# Patient Record
Sex: MALE | Race: WHITE
[De-identification: names, ages, dates, MRNs, and addresses within clinical notes are randomized per-mention and may not be internally consistent; named-entity substitution may affect disease eponyms.]

---

## 2017-02-06 ENCOUNTER — RECORD ABSTRACTING (OUTPATIENT)
Age: 82
End: 2017-02-06

## 2017-02-06 DIAGNOSIS — I51.9 HEART DISEASE, UNSPECIFIED: ICD-10-CM

## 2017-02-06 DIAGNOSIS — I72.9 ANEURYSM OF UNSPECIFIED SITE: ICD-10-CM

## 2017-02-06 DIAGNOSIS — Z48.02 ENCOUNTER FOR REMOVAL OF SUTURES: ICD-10-CM

## 2017-02-06 DIAGNOSIS — Z98.890 OTHER SPECIFIED POSTPROCEDURAL STATES: ICD-10-CM

## 2017-02-06 DIAGNOSIS — L57.0 ACTINIC KERATOSIS: ICD-10-CM

## 2017-02-06 DIAGNOSIS — D23.39 OTHER BENIGN NEOPLASM OF SKIN OF OTHER PARTS OF FACE: ICD-10-CM

## 2017-02-06 DIAGNOSIS — Z78.9 OTHER SPECIFIED HEALTH STATUS: ICD-10-CM

## 2017-03-15 ENCOUNTER — APPOINTMENT (OUTPATIENT)
Dept: DERMATOLOGY | Facility: CLINIC | Age: 82
End: 2017-03-15

## 2017-04-10 ENCOUNTER — APPOINTMENT (OUTPATIENT)
Dept: DERMATOLOGY | Facility: CLINIC | Age: 82
End: 2017-04-10

## 2017-04-10 VITALS — HEIGHT: 63 IN | WEIGHT: 133 LBS | BODY MASS INDEX: 23.57 KG/M2

## 2017-04-10 DIAGNOSIS — Z85.828 PERSONAL HISTORY OF OTHER MALIGNANT NEOPLASM OF SKIN: ICD-10-CM

## 2017-04-10 RX ORDER — ASPIRIN AND DIPYRIDAMOLE 25; 200 MG/1; MG/1
25-200 CAPSULE, EXTENDED RELEASE ORAL
Refills: 0 | Status: ACTIVE | COMMUNITY

## 2017-04-10 RX ORDER — CYCLOBENZAPRINE HCL 5 MG
TABLET ORAL
Refills: 0 | Status: ACTIVE | COMMUNITY

## 2017-04-10 RX ORDER — RANITIDINE HYDROCHLORIDE 150 MG/1
150 TABLET, FILM COATED ORAL
Refills: 0 | Status: ACTIVE | COMMUNITY

## 2017-04-10 RX ORDER — FINASTERIDE 5 MG/1
5 TABLET, FILM COATED ORAL
Refills: 0 | Status: ACTIVE | COMMUNITY

## 2017-08-18 ENCOUNTER — OUTPATIENT (OUTPATIENT)
Dept: OUTPATIENT SERVICES | Facility: HOSPITAL | Age: 82
LOS: 1 days | Discharge: HOME | End: 2017-08-18

## 2017-08-18 DIAGNOSIS — E78.5 HYPERLIPIDEMIA, UNSPECIFIED: ICD-10-CM

## 2017-09-12 ENCOUNTER — APPOINTMENT (OUTPATIENT)
Dept: DERMATOLOGY | Facility: CLINIC | Age: 82
End: 2017-09-12
Payer: MEDICARE

## 2017-09-12 DIAGNOSIS — L82.1 OTHER SEBORRHEIC KERATOSIS: ICD-10-CM

## 2017-09-12 DIAGNOSIS — I78.1 NEVUS, NON-NEOPLASTIC: ICD-10-CM

## 2017-09-12 DIAGNOSIS — L81.4 OTHER MELANIN HYPERPIGMENTATION: ICD-10-CM

## 2017-09-12 DIAGNOSIS — D22.9 MELANOCYTIC NEVI, UNSPECIFIED: ICD-10-CM

## 2017-09-12 DIAGNOSIS — D48.5 NEOPLASM OF UNCERTAIN BEHAVIOR OF SKIN: ICD-10-CM

## 2017-09-12 DIAGNOSIS — Z00.00 ENCOUNTER FOR GENERAL ADULT MEDICAL EXAMINATION W/OUT ABNORMAL FINDINGS: ICD-10-CM

## 2017-09-12 PROCEDURE — 99213 OFFICE O/P EST LOW 20 MIN: CPT | Mod: 25

## 2017-09-12 PROCEDURE — 11100 BX SKIN SUBCUTANEOUS&/MUCOUS MEMBRANE 1 LESION: CPT

## 2017-09-12 RX ORDER — CHOLECALCIFEROL (VITAMIN D3) 25 MCG
TABLET ORAL
Refills: 0 | Status: DISCONTINUED | COMMUNITY
End: 2017-09-12

## 2017-09-19 LAB — CORE LAB BIOPSY: NORMAL

## 2018-03-14 ENCOUNTER — APPOINTMENT (OUTPATIENT)
Dept: DERMATOLOGY | Facility: CLINIC | Age: 83
End: 2018-03-14

## 2018-03-21 ENCOUNTER — OUTPATIENT (OUTPATIENT)
Dept: OUTPATIENT SERVICES | Facility: HOSPITAL | Age: 83
LOS: 1 days | Discharge: HOME | End: 2018-03-21

## 2018-03-21 DIAGNOSIS — D64.9 ANEMIA, UNSPECIFIED: ICD-10-CM

## 2018-03-21 LAB
ACANTHOCYTES BLD QL SMEAR: SLIGHT — SIGNIFICANT CHANGE UP
ANISOCYTOSIS BLD QL: SLIGHT — SIGNIFICANT CHANGE UP
BASOPHILS NFR BLD AUTO: 1 % — SIGNIFICANT CHANGE UP (ref 0–2)
ELLIPTOCYTES BLD QL SMEAR: SLIGHT — SIGNIFICANT CHANGE UP
EOSINOPHIL NFR BLD AUTO: 0 % — SIGNIFICANT CHANGE UP (ref 0–6)
HCT VFR BLD CALC: 30.4 % — LOW (ref 39–50)
HGB BLD-MCNC: 10.4 G/DL — LOW (ref 13–17)
IRON SATN MFR SERPL: 69 UG/DL — SIGNIFICANT CHANGE UP (ref 45–165)
LYMPHOCYTES # BLD AUTO: 0.76 K/UL — LOW (ref 1–3.3)
LYMPHOCYTES # BLD AUTO: 24 % — SIGNIFICANT CHANGE UP (ref 13–44)
MANUAL SMEAR VERIFICATION: SIGNIFICANT CHANGE UP
MCHC RBC-ENTMCNC: 32.5 PG — SIGNIFICANT CHANGE UP (ref 27–34)
MCHC RBC-ENTMCNC: 34.2 GM/DL — SIGNIFICANT CHANGE UP (ref 32–36)
MCV RBC AUTO: 95 FL — SIGNIFICANT CHANGE UP (ref 80–100)
MONOCYTES NFR BLD AUTO: 15 % — HIGH (ref 2–14)
NEUTROPHILS # BLD AUTO: 1.89 K/UL — SIGNIFICANT CHANGE UP (ref 1.8–7.4)
NEUTROPHILS NFR BLD AUTO: 60 % — SIGNIFICANT CHANGE UP (ref 43–77)
NRBC # BLD: 0 /100 — SIGNIFICANT CHANGE UP (ref 0–0)
NRBC # BLD: SIGNIFICANT CHANGE UP /100 WBCS (ref 0–0)
OVALOCYTES BLD QL SMEAR: SLIGHT — SIGNIFICANT CHANGE UP
PLAT MORPH BLD: NORMAL — SIGNIFICANT CHANGE UP
PLATELET # BLD AUTO: 74 K/UL — LOW (ref 150–400)
PLATELET COUNT - ESTIMATE: (no result)
POIKILOCYTOSIS BLD QL AUTO: SIGNIFICANT CHANGE UP
RBC # BLD: 3.2 M/UL — LOW (ref 4.2–5.8)
RBC # FLD: 13.3 % — SIGNIFICANT CHANGE UP (ref 10.3–14.5)
RBC BLD AUTO: (no result)
WBC # BLD: 3.15 K/UL — LOW (ref 3.8–10.5)
WBC # FLD AUTO: 3.15 K/UL — LOW (ref 3.8–10.5)

## 2018-06-12 ENCOUNTER — OUTPATIENT (OUTPATIENT)
Dept: OUTPATIENT SERVICES | Facility: HOSPITAL | Age: 83
LOS: 1 days | Discharge: HOME | End: 2018-06-12

## 2018-06-12 DIAGNOSIS — R97.20 ELEVATED PROSTATE SPECIFIC ANTIGEN [PSA]: ICD-10-CM

## 2018-06-12 DIAGNOSIS — R53.83 OTHER FATIGUE: ICD-10-CM

## 2018-06-12 LAB
ALBUMIN SERPL ELPH-MCNC: 3.9 G/DL — SIGNIFICANT CHANGE UP (ref 3.3–5)
ALP SERPL-CCNC: 67 U/L — SIGNIFICANT CHANGE UP (ref 40–120)
ALT FLD-CCNC: 27 U/L — SIGNIFICANT CHANGE UP (ref 12–78)
ANION GAP SERPL CALC-SCNC: 5 MMOL/L — SIGNIFICANT CHANGE UP (ref 5–17)
AST SERPL-CCNC: 36 U/L — SIGNIFICANT CHANGE UP (ref 15–37)
BASOPHILS # BLD AUTO: 0.02 K/UL — SIGNIFICANT CHANGE UP (ref 0–0.2)
BASOPHILS NFR BLD AUTO: 0.6 % — SIGNIFICANT CHANGE UP (ref 0–2)
BILIRUB SERPL-MCNC: 1.5 MG/DL — HIGH (ref 0.2–1.2)
BUN SERPL-MCNC: 22 MG/DL — SIGNIFICANT CHANGE UP (ref 7–23)
CALCIUM SERPL-MCNC: 8.8 MG/DL — SIGNIFICANT CHANGE UP (ref 8.5–10.1)
CHLORIDE SERPL-SCNC: 107 MMOL/L — SIGNIFICANT CHANGE UP (ref 96–108)
CHOLEST SERPL-MCNC: 137 MG/DL — SIGNIFICANT CHANGE UP (ref 10–199)
CO2 SERPL-SCNC: 28 MMOL/L — SIGNIFICANT CHANGE UP (ref 22–31)
CREAT SERPL-MCNC: 1.02 MG/DL — SIGNIFICANT CHANGE UP (ref 0.5–1.3)
EOSINOPHIL # BLD AUTO: 0 K/UL — SIGNIFICANT CHANGE UP (ref 0–0.5)
EOSINOPHIL NFR BLD AUTO: 0 % — SIGNIFICANT CHANGE UP (ref 0–6)
GLUCOSE SERPL-MCNC: 86 MG/DL — SIGNIFICANT CHANGE UP (ref 70–99)
HCT VFR BLD CALC: 33.7 % — LOW (ref 39–50)
HDLC SERPL-MCNC: 75 MG/DL — SIGNIFICANT CHANGE UP (ref 40–125)
HGB BLD-MCNC: 11.3 G/DL — LOW (ref 13–17)
IMM GRANULOCYTES NFR BLD AUTO: 0.6 % — SIGNIFICANT CHANGE UP (ref 0–1.5)
LIPID PNL WITH DIRECT LDL SERPL: 50 MG/DL — SIGNIFICANT CHANGE UP
LYMPHOCYTES # BLD AUTO: 0.84 K/UL — LOW (ref 1–3.3)
LYMPHOCYTES # BLD AUTO: 23.3 % — SIGNIFICANT CHANGE UP (ref 13–44)
MCHC RBC-ENTMCNC: 32.6 PG — SIGNIFICANT CHANGE UP (ref 27–34)
MCHC RBC-ENTMCNC: 33.5 GM/DL — SIGNIFICANT CHANGE UP (ref 32–36)
MCV RBC AUTO: 97.1 FL — SIGNIFICANT CHANGE UP (ref 80–100)
MONOCYTES # BLD AUTO: 0.35 K/UL — SIGNIFICANT CHANGE UP (ref 0–0.9)
MONOCYTES NFR BLD AUTO: 9.7 % — SIGNIFICANT CHANGE UP (ref 2–14)
NEUTROPHILS # BLD AUTO: 2.37 K/UL — SIGNIFICANT CHANGE UP (ref 1.8–7.4)
NEUTROPHILS NFR BLD AUTO: 65.8 % — SIGNIFICANT CHANGE UP (ref 43–77)
NRBC # BLD: 0 /100 WBCS — SIGNIFICANT CHANGE UP (ref 0–0)
PLATELET # BLD AUTO: 92 K/UL — LOW (ref 150–400)
POTASSIUM SERPL-MCNC: 4.9 MMOL/L — SIGNIFICANT CHANGE UP (ref 3.5–5.3)
POTASSIUM SERPL-SCNC: 4.9 MMOL/L — SIGNIFICANT CHANGE UP (ref 3.5–5.3)
PROT SERPL-MCNC: 7.1 GM/DL — SIGNIFICANT CHANGE UP (ref 6–8.3)
PSA FREE FLD-MCNC: 0.22 NG/ML — SIGNIFICANT CHANGE UP
PSA FREE FLD-MCNC: 42.3 — SIGNIFICANT CHANGE UP
PSA FREE MFR FLD: 0.52 NG/ML — SIGNIFICANT CHANGE UP (ref 0–4)
RBC # BLD: 3.47 M/UL — LOW (ref 4.2–5.8)
RBC # FLD: 13.3 % — SIGNIFICANT CHANGE UP (ref 10.3–14.5)
SODIUM SERPL-SCNC: 140 MMOL/L — SIGNIFICANT CHANGE UP (ref 135–145)
TOTAL CHOLESTEROL/HDL RATIO MEASUREMENT: 1.8 RATIO — LOW (ref 3.4–9.6)
TRIGL SERPL-MCNC: 58 MG/DL — SIGNIFICANT CHANGE UP (ref 10–149)
WBC # BLD: 3.6 K/UL — LOW (ref 3.8–10.5)
WBC # FLD AUTO: 3.6 K/UL — LOW (ref 3.8–10.5)

## 2019-04-10 ENCOUNTER — OUTPATIENT (OUTPATIENT)
Dept: OUTPATIENT SERVICES | Facility: HOSPITAL | Age: 84
LOS: 1 days | Discharge: HOME | End: 2019-04-10

## 2019-04-15 DIAGNOSIS — I25.10 ATHEROSCLEROTIC HEART DISEASE OF NATIVE CORONARY ARTERY WITHOUT ANGINA PECTORIS: ICD-10-CM

## 2019-06-09 PROBLEM — I51.9 HEART DISEASE: Status: ACTIVE | Noted: 2017-02-06

## 2019-12-19 ENCOUNTER — EMERGENCY (EMERGENCY)
Facility: HOSPITAL | Age: 84
LOS: 0 days | Discharge: HOME | End: 2019-12-19
Attending: EMERGENCY MEDICINE
Payer: MEDICARE

## 2019-12-19 VITALS
DIASTOLIC BLOOD PRESSURE: 74 MMHG | SYSTOLIC BLOOD PRESSURE: 146 MMHG | TEMPERATURE: 98 F | HEART RATE: 60 BPM | OXYGEN SATURATION: 100 % | RESPIRATION RATE: 16 BRPM

## 2019-12-19 VITALS
HEART RATE: 71 BPM | SYSTOLIC BLOOD PRESSURE: 193 MMHG | DIASTOLIC BLOOD PRESSURE: 77 MMHG | TEMPERATURE: 98 F | RESPIRATION RATE: 16 BRPM | OXYGEN SATURATION: 100 %

## 2019-12-19 DIAGNOSIS — N40.0 BENIGN PROSTATIC HYPERPLASIA WITHOUT LOWER URINARY TRACT SYMPTOMS: ICD-10-CM

## 2019-12-19 DIAGNOSIS — S00.33XA CONTUSION OF NOSE, INITIAL ENCOUNTER: ICD-10-CM
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
## 2019-12-19 DIAGNOSIS — K21.9 GASTRO-ESOPHAGEAL REFLUX DISEASE WITHOUT ESOPHAGITIS: ICD-10-CM

## 2019-12-19 DIAGNOSIS — I71.2 THORACIC AORTIC ANEURYSM, WITHOUT RUPTURE: ICD-10-CM

## 2019-12-19 DIAGNOSIS — I10 ESSENTIAL (PRIMARY) HYPERTENSION: ICD-10-CM
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
## 2019-12-19 DIAGNOSIS — I25.10 ATHEROSCLEROTIC HEART DISEASE OF NATIVE CORONARY ARTERY WITHOUT ANGINA PECTORIS: ICD-10-CM

## 2019-12-19 DIAGNOSIS — S00.31XA ABRASION OF NOSE, INITIAL ENCOUNTER: ICD-10-CM

## 2019-12-19 DIAGNOSIS — Z87.01 PERSONAL HISTORY OF PNEUMONIA (RECURRENT): ICD-10-CM

## 2019-12-19 DIAGNOSIS — E78.00 PURE HYPERCHOLESTEROLEMIA, UNSPECIFIED: ICD-10-CM

## 2019-12-19 PROCEDURE — 72125 CT NECK SPINE W/O DYE: CPT

## 2019-12-19 PROCEDURE — 70450 CT HEAD/BRAIN W/O DYE: CPT

## 2019-12-19 PROCEDURE — 99283 EMERGENCY DEPT VISIT LOW MDM: CPT

## 2019-12-19 PROCEDURE — 99284 EMERGENCY DEPT VISIT MOD MDM: CPT | Mod: 25

## 2019-12-19 PROCEDURE — 76376 3D RENDER W/INTRP POSTPROCES: CPT

## 2019-12-19 PROCEDURE — 70450 CT HEAD/BRAIN W/O DYE: CPT | Mod: 26

## 2019-12-19 PROCEDURE — 72125 CT NECK SPINE W/O DYE: CPT | Mod: 26

## 2019-12-19 PROCEDURE — 70486 CT MAXILLOFACIAL W/O DYE: CPT

## 2019-12-19 PROCEDURE — 70486 CT MAXILLOFACIAL W/O DYE: CPT | Mod: 26

## 2019-12-19 PROCEDURE — 76376 3D RENDER W/INTRP POSTPROCES: CPT | Mod: 26
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
## 2019-12-19 NOTE — ED STATDOCS - NSFOLLOWUPINSTRUCTIONS_ED_ALL_ED_FT
Abrasion    WHAT YOU NEED TO KNOW:    An abrasion is a scrape on your skin. It happens when your skin rubs against a rough surface. Some examples of an abrasion include rug burn, a skinned elbow, or road rash. Abrasions can be many shapes and sizes. The wound may hurt, bleed, bruise, or swell.     DISCHARGE INSTRUCTIONS:    Return to the emergency department if:     The bleeding does not stop after 10 minutes of firm pressure.      You cannot rinse one or more foreign objects out of your wound.      You have red streaks on your skin coming from your wound.    Contact your healthcare provider if:     You have a fever or chills.       Your abrasion is red, warm, swollen, or draining pus.      You have questions or concerns about your condition or care.    Care for your abrasion:     Wash your hands and dry them with a clean towel.      Press a clean cloth against your wound to stop any bleeding.      Rinse your wound with a lot of clean water. Do not use harsh soap, alcohol, or iodine solutions.      Use a clean, wet cloth to remove any objects, such as small pieces of rocks or dirt.      Rub antibiotic ointment on your wound. This may help prevent infection and help your wound heal.      Cover the wound with a non-stick bandage. Change the bandage daily, and if gets wet or dirty.     Follow up with your healthcare provider as directed: Write down your questions so you remember to ask them during your visits.    Contusion in Adults    WHAT YOU NEED TO KNOW:    A contusion is a bruise that appears on your skin after an injury. A bruise happens when small blood vessels tear but skin does not. When blood vessels tear, blood leaks into nearby tissue, such as soft tissue or muscle.    DISCHARGE INSTRUCTIONS:    Return to the emergency department if:     You have new trouble moving the injured area.      You have tingling or numbness in or near the injured area.      Your hand or foot below the bruise gets cold or turns pale.     Contact your healthcare provider if:     You find a new lump in the injured area.      Your symptoms do not improve with treatment after 4 to 5 days.      You have questions or concerns about your condition or care.    Medicines: You may need any of the following:     NSAIDs help decrease swelling and pain or fever. This medicine is available with or without a doctor's order. NSAIDs can cause stomach bleeding or kidney problems in certain people. If you take blood thinner medicine, always ask your healthcare provider if NSAIDs are safe for you. Always read the medicine label and follow directions.      Prescription pain medicine may be given. Do not wait until the pain is severe before you take your medicine.      Take your medicine as directed. Contact your healthcare provider if you think your medicine is not helping or if you have side effects. Tell him of her if you are allergic to any medicine. Keep a list of the medicines, vitamins, and herbs you take. Include the amounts, and when and why you take them. Bring the list or the pill bottles to follow-up visits. Carry your medicine list with you in case of an emergency.    Follow up with your healthcare provider as directed: You may need to return within a week to check your injury again. Write down your questions so you remember to ask them during your visits.    Help a contusion heal:     Rest the injured area or use it less than usual. If you bruised your leg or foot, you may need crutches or a cane to help you walk. This will help you keep weight off your injured body part.       Apply ice to decrease swelling and pain. Ice may also help prevent tissue damage. Use an ice pack, or put crushed ice in a plastic bag. Cover it with a towel and place it on your bruise for 15 to 20 minutes every hour or as directed.      Use compression to support the area and decrease swelling. Wrap an elastic bandage around the area over the bruised muscle. Make sure the bandage is not too tight. You should be able to fit 1 finger between the bandage and your skin.      Elevate (raise) your injured body part above the level of your heart to help decrease pain and swelling. Use pillows, blankets, or rolled towels to elevate the area as often as you can.      Do not drink alcohol as directed. Alcohol may slow healing.      Do not stretch injured muscles right after your injury. Ask your healthcare provider when and how you may safely stretch after your injury. Gentle stretches can help increase your flexibility.      Do not massage the area or put heating pads on the bruise right after your injury. Heat and massage may slow healing. Your healthcare provider may tell you to apply heat after several days. At that time, heat will start to help the injury heal.    Prevent another contusion:     Stretch and warm up before you play sports or exercise.      Wear protective gear when you play sports. Examples are shin guards and padding.       If you begin a new physical activity, start slowly to give your body a chance to adjust.    Head Injury    WHAT YOU NEED TO KNOW:    A head injury is most often caused by a blow to the head. This may occur from a fall, bicycle injury, sports injury, being struck in the head, or a motor vehicle accident.     DISCHARGE INSTRUCTIONS:    Call 911 or have someone else call for any of the following:     You cannot be woken.      You have a seizure.      You stop responding to others or you faint.      You have blurry or double vision.      Your speech becomes slurred or confused.      You have arm or leg weakness, loss of feeling, or new problems with coordination.      Your pupils are larger than usual or one pupil is a different size than the other.       You have blood or clear fluid coming out of your ears or nose.    Return to the emergency department if:     You have repeated or forceful vomiting.      You feel confused.      Your headache gets worse or becomes severe.      You or someone caring for you notices that you are harder to wake than usual.    Contact your healthcare provider if:     Your symptoms last longer than 6 weeks after the injury.      You have questions or concerns about your condition or care.    Medicines:     Acetaminophen decreases pain. Acetaminophen is available without a doctor's order. Ask how much to take and how often to take it. Follow directions. Acetaminophen can cause liver damage if not taken correctly.      Take your medicine as directed. Contact your healthcare provider if you think your medicine is not helping or if you have side effects. Tell him or her if you are allergic to any medicine. Keep a list of the medicines, vitamins, and herbs you take. Include the amounts, and when and why you take them. Bring the list or the pill bottles to follow-up visits. Carry your medicine list with you in case of an emergency.    Self-care:     Rest or do quiet activities for 24 to 48 hours. Limit your time watching TV, using the computer, or doing tasks that require a lot of thinking. Slowly return to your normal activities as directed. Do not play sports or do activities that may cause you to get hit in the head. Ask your healthcare provider when you can return to sports.       Apply ice on your head for 15 to 20 minutes every hour or as directed. Use an ice pack, or put crushed ice in a plastic bag. Cover it with a towel before you apply it to your skin. Ice helps prevent tissue damage and decreases swelling and pain.       Have someone stay with you for 24 hours or as directed. This person can monitor you for complications and call 911. When you are awake the person should ask you a few questions to see if you are thinking clearly. An example would be to ask your name or your address.     Prevent another head injury:     Wear a helmet that fits properly. Do this when you play sports, or ride a bike, scooter, or skateboard. Helmets help decrease your risk of a serious head injury. Talk to your healthcare provider about other ways you can protect yourself if you play sports.      Wear your seat belt every time you are in a car. This helps to decrease your risk for a head injury if you are in a car accident.     Follow up with your healthcare provider as directed: Write down your questions so you remember to ask them during your visits.

## 2019-12-19 NOTE — ED ADULT NURSE NOTE - CHPI ED NUR SYMPTOMS NEG
no numbness/no deformity/no fever/no bleeding/no confusion/no tingling/no loss of consciousness/no vomiting/no weakness

## 2019-12-19 NOTE — ED STATDOCS - ATTENDING CONTRIBUTION TO CARE
I, Dyllan Villegas MD,  performed the initial face to face bedside interview with this patient regarding history of present illness, review of symptoms and relevant past medical, social and family history.  I completed an independent physical examination.  I was the initial provider who evaluated this patient. I have signed out the follow up of any pending tests (i.e. labs, radiological studies) to the ACP.  I have communicated the patient’s plan of care and disposition with the ACP.  The history, relevant review of systems, past medical and surgical history, medical decision making, and physical examination was documented by the scribe in my presence and I attest to the accuracy of the documentation.
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
## 2019-12-19 NOTE — ED STATDOCS - PROGRESS NOTE DETAILS
Patient seen and evaluated with ED attending at intake intially.  Well appearing, normal mentation, SOTO.  Abrasion to nasal bridge with skin avulsion, no suturable laceration.  CT with no acute traumatic injury.  Reviewed symptom management at home, return precautions, head injury precautions and PMD f/u -Kevin Milton PA-C

## 2019-12-19 NOTE — ED STATDOCS - ENMT, MLM
Mouth with normal mucosa  Throat has no vesicles, no oropharyngeal exudates and uvula is midline. +avulsion bridge of nose, dried blood left nares, no active bleeding, swelling to nose, teeth intact

## 2019-12-19 NOTE — ED STATDOCS - PATIENT PORTAL LINK FT
You can access the FollowMyHealth Patient Portal offered by [facility] by registering at the following website: [email or web address]. By joining [employer]’s FollowMyHealth portal, you will also be able to view your health information using other applications (apps) compatible with our system.

## 2019-12-19 NOTE — ED STATDOCS - CARE PLAN
Principal Discharge DX:	Contusion of nose, initial encounter  Secondary Diagnosis:	Abrasion of nose  Secondary Diagnosis:	Fall
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
## 2019-12-19 NOTE — ED ADULT NURSE NOTE - BREATHING, MLM
Would like to see urology  Has been on 2 meds without success  Getting up several times at night Spontaneous, unlabored and symmetrical

## 2019-12-20 ENCOUNTER — TRANSCRIPTION ENCOUNTER (OUTPATIENT)
Age: 84
End: 2019-12-20

## 2020-01-30 ENCOUNTER — OUTPATIENT (OUTPATIENT)
Dept: OUTPATIENT SERVICES | Facility: HOSPITAL | Age: 85
LOS: 1 days | End: 2020-01-30
Payer: MEDICARE

## 2020-01-30 DIAGNOSIS — E78.5 HYPERLIPIDEMIA, UNSPECIFIED: ICD-10-CM

## 2020-01-30 DIAGNOSIS — E11.9 TYPE 2 DIABETES MELLITUS WITHOUT COMPLICATIONS: ICD-10-CM

## 2020-01-30 PROCEDURE — 80061 LIPID PANEL: CPT

## 2020-01-30 PROCEDURE — 36415 COLL VENOUS BLD VENIPUNCTURE: CPT

## 2020-01-30 PROCEDURE — 82550 ASSAY OF CK (CPK): CPT

## 2020-01-30 PROCEDURE — 83036 HEMOGLOBIN GLYCOSYLATED A1C: CPT

## 2020-01-30 PROCEDURE — 82248 BILIRUBIN DIRECT: CPT

## 2020-01-30 PROCEDURE — 86140 C-REACTIVE PROTEIN: CPT

## 2020-01-30 PROCEDURE — 80053 COMPREHEN METABOLIC PANEL: CPT

## 2020-01-30 PROCEDURE — 82306 VITAMIN D 25 HYDROXY: CPT

## 2020-01-31 DIAGNOSIS — E78.5 HYPERLIPIDEMIA, UNSPECIFIED: ICD-10-CM

## 2020-01-31 DIAGNOSIS — E11.9 TYPE 2 DIABETES MELLITUS WITHOUT COMPLICATIONS: ICD-10-CM

## 2020-03-01 ENCOUNTER — EMERGENCY (EMERGENCY)
Facility: HOSPITAL | Age: 85
LOS: 0 days | Discharge: HOME | End: 2020-03-01
Attending: EMERGENCY MEDICINE
Payer: MEDICARE

## 2020-03-01 VITALS
HEART RATE: 78 BPM | RESPIRATION RATE: 19 BRPM | TEMPERATURE: 99 F | OXYGEN SATURATION: 97 % | DIASTOLIC BLOOD PRESSURE: 79 MMHG | SYSTOLIC BLOOD PRESSURE: 153 MMHG

## 2020-03-01 VITALS — WEIGHT: 132.06 LBS | HEIGHT: 63 IN

## 2020-03-01 DIAGNOSIS — R01.1 CARDIAC MURMUR, UNSPECIFIED: ICD-10-CM

## 2020-03-01 DIAGNOSIS — I10 ESSENTIAL (PRIMARY) HYPERTENSION: ICD-10-CM

## 2020-03-01 DIAGNOSIS — I25.10 ATHEROSCLEROTIC HEART DISEASE OF NATIVE CORONARY ARTERY WITHOUT ANGINA PECTORIS: ICD-10-CM

## 2020-03-01 DIAGNOSIS — E78.00 PURE HYPERCHOLESTEROLEMIA, UNSPECIFIED: ICD-10-CM

## 2020-03-01 DIAGNOSIS — Z90.49 ACQUIRED ABSENCE OF OTHER SPECIFIED PARTS OF DIGESTIVE TRACT: ICD-10-CM

## 2020-03-01 DIAGNOSIS — Z88.8 ALLERGY STATUS TO OTHER DRUGS, MEDICAMENTS AND BIOLOGICAL SUBSTANCES STATUS: ICD-10-CM

## 2020-03-01 DIAGNOSIS — M50.90 CERVICAL DISC DISORDER, UNSPECIFIED, UNSPECIFIED CERVICAL REGION: ICD-10-CM

## 2020-03-01 DIAGNOSIS — N40.0 BENIGN PROSTATIC HYPERPLASIA WITHOUT LOWER URINARY TRACT SYMPTOMS: ICD-10-CM

## 2020-03-01 DIAGNOSIS — Z95.5 PRESENCE OF CORONARY ANGIOPLASTY IMPLANT AND GRAFT: ICD-10-CM

## 2020-03-01 DIAGNOSIS — K64.9 UNSPECIFIED HEMORRHOIDS: ICD-10-CM

## 2020-03-01 DIAGNOSIS — K62.5 HEMORRHAGE OF ANUS AND RECTUM: ICD-10-CM

## 2020-03-01 DIAGNOSIS — R41.3 OTHER AMNESIA: ICD-10-CM

## 2020-03-01 DIAGNOSIS — I71.2 THORACIC AORTIC ANEURYSM, WITHOUT RUPTURE: ICD-10-CM

## 2020-03-01 DIAGNOSIS — K21.9 GASTRO-ESOPHAGEAL REFLUX DISEASE WITHOUT ESOPHAGITIS: ICD-10-CM

## 2020-03-01 PROCEDURE — 99282 EMERGENCY DEPT VISIT SF MDM: CPT

## 2020-03-01 NOTE — ED ADULT TRIAGE NOTE - CHIEF COMPLAINT QUOTE
Pt complains of rectal bleeding beginning approx 1 hour ago. Pt states that he has had constipation, took a suppository and then was able to have a bowel movement. Pt states that there was bright red blood when he wiped.

## 2020-03-01 NOTE — ED ADULT NURSE NOTE - OBJECTIVE STATEMENT
pt is a 89 y/o male w/ pmhx of HTN and hemmorhoids presenting to ED for eval of one large episode of blood in stool after straining to have BM today. pt states he has been constipated over the past several days, self administered a suppository, which was productive. pt states episode of bright red blood was self limiting.

## 2020-03-01 NOTE — ED ADULT NURSE NOTE - NSIMPLEMENTINTERV_GEN_ALL_ED
Implemented All Fall with Harm Risk Interventions:  [place] to call system. Call bell, personal items and telephone within reach. Instruct patient to call for assistance. Room bathroom lighting operational. Non-slip footwear when patient is off stretcher. Physically safe environment: no spills, clutter or unnecessary equipment. Stretcher in lowest position, wheels locked, appropriate side rails in place. Provide visual cue, wrist band, yellow gown, etc. Monitor gait and stability. Monitor for mental status changes and reorient to person, place, and time. Review medications for side effects contributing to fall risk. Reinforce activity limits and safety measures with patient and family. Provide visual clues: red socks.

## 2020-03-01 NOTE — ED PROVIDER NOTE - NS_ ATTENDINGSCRIBEDETAILS _ED_A_ED_FT
I, Chet Vera MD,  performed the initial face to face bedside interview with this patient regarding history of present illness, review of symptoms and relevant past medical, social and family history.  I completed an independent physical examination.    The history, relevant review of systems, past medical and surgical history, medical decision making, and physical examination was documented by the scribe in my presence and I attest to the accuracy of the documentation.
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
## 2020-03-01 NOTE — ED STATDOCS - PROGRESS NOTE DETAILS
Saurav DE LEON for ED Attending Dr. Ramon: 88 y/o male with PMHx of AAA, BPH, HTN, HLD, GERD, CAD presents to the ED c/o bright red blood in stool. +Rectal pain. Pt notes that episode occurred after pt attempted to use suppository tor resolve constipation. On 81mg ASA. No other acute complaints in ED at this time. Will send pt to the Main ED for further evaluation.

## 2020-03-01 NOTE — ED PROVIDER NOTE - OBJECTIVE STATEMENT
89 y/o with a PMHx of AA, cervical disc disease, memory loss, Hemorrhoids, heart murmur, BPH, GERD, trauma, CAD, hypercholesterolemia, hypertension, pneumonia, pleurisy, presents to the ED c/o rectal bleeding and pain. Pt was constipated for past few days. Put a suppository in today with no relief. Tried to go to pass stool, wiped (+pain), and noticed blood. Came to [facility] for further evaluation. About 20 minutes prior to evaluation, was able to pass stool. Denied seeing blood in the stool at that time. Has a hx of Hemorrhoids. Takes baby ASA. PMD: Dr. Mensah. No other complaints at this time.

## 2020-03-01 NOTE — ED PROVIDER NOTE - PATIENT PORTAL LINK FT
You can access the FollowMyHealth Patient Portal offered by [facility] by registering at the following website: [email or web address]. By joining [employer]’s FollowMyHealth portal, you will also be able to view your health information using other applications (apps) compatible with our system.

## 2020-03-03 NOTE — ED ADULT TRIAGE NOTE - NS ED TRIAGE AVPU SCALE
The stitches will dissolve on its own. Return to the ER with any concerning or worsening symptoms.  
Alert-The patient is alert, awake and responds to voice. The patient is oriented to time, place, and person. The triage nurse is able to obtain subjective information.

## 2020-05-02 ENCOUNTER — TRANSCRIPTION ENCOUNTER (OUTPATIENT)
Age: 85
End: 2020-05-02

## 2020-05-29 ENCOUNTER — TRANSCRIPTION ENCOUNTER (OUTPATIENT)
Age: 85
End: 2020-05-29

## 2020-07-08 NOTE — ED STATDOCS - DR. NAME
Subjective:     Abbey Lewis is a 5 y o  male who is brought in for this well child visit  History provided by: patient and Navin Ramirez will be going into the 4th grade, at [employer]  He requires help in math and in reading  Caserashid is able to swim and ride a bicycle  He participates in karate 3 times per week  Medications:  Multiple vitamins with fluoride, and Zyrtec  Allergies:  Clindamycin  Dentist:  Dr Pretty Malik    Current Issues:  Current concerns: none  Well Child Assessment:  History was provided by the grandmother (Abbey)  Abbey lives with his mother (Maternal grandparents live next door)  Interval problems include caregiver stress  (COVID-19 stresses, including mother being laid off for 3 weeks)     Nutrition  Types of intake include cow's milk, meats, eggs, fish, vegetables, fruits and cereals  Junk food includes candy, chips and desserts  Dental  The patient has a dental home (Dr Pretty Malik)  The patient brushes teeth regularly  The patient does not floss regularly  Last dental exam was less than 6 months ago  Elimination  Elimination problems include constipation  Elimination problems do not include urinary symptoms  (Constipation has improved) There is no bed wetting  Behavioral  Behavioral issues do not include misbehaving with peers or performing poorly at school  Disciplinary methods include praising good behavior  Sleep  Average sleep duration is 10 hours  The patient does not snore  There are no sleep problems  Safety  There is smoking in the home  Home has working smoke alarms? yes  Home has working carbon monoxide alarms? yes  There is a gun in home (Guns stored in locked cabinet)  School  Current grade level is 4th  Current school district is [address] [place] [address]; [place]  There are signs of learning disabilities (Receives help in math and reading)  Child is performing acceptably in school     Screening  Immunizations are up-to-date  There are no risk factors for hearing loss  There are risk factors for anemia  There are risk factors for dyslipidemia  There are no risk factors for tuberculosis  Social  The caregiver enjoys the child  After school, the child is at home with a parent  The child spends 3 hours in front of a screen (tv or computer) per day       Past Medical History:   Diagnosis Date    Allergic rhinitis     Constipation     Otitis media     Speech delay     Strep throat      Past Surgical History:   Procedure Laterality Date    CIRCUMCISION  03/2011     Family History   Problem Relation Age of Onset    No Known Problems Mother     No Known Problems Father     No Known Problems Maternal Grandmother     No Known Problems Maternal Grandfather     Glaucoma Paternal Grandmother     Hypertension Paternal Grandfather     Down syndrome Maternal Uncle     Hearing loss Maternal Uncle     Alcohol abuse Neg Hx     Substance Abuse Neg Hx     Mental illness Neg Hx      Social History     Socioeconomic History    Marital status: Single     Spouse name: Not on file    Number of children: Not on file    Years of education: Not on file    Highest education level: Not on file   Occupational History    Not on file   Social Needs    Financial resource strain: Not on file    Food insecurity:     Worry: Not on file     Inability: Not on file    Transportation needs:     Medical: Not on file     Non-medical: Not on file   Tobacco Use    Smoking status: Never Smoker    Smokeless tobacco: Never Used   Substance and Sexual Activity    Alcohol use: Not on file    Drug use: Not on file    Sexual activity: Not on file   Lifestyle    Physical activity:     Days per week: Not on file     Minutes per session: Not on file    Stress: Not on file   Relationships    Social connections:     Talks on phone: Not on file     Gets together: Not on file     Attends [employer]ism service: Not on file     Active member of club or organization: Not on file     Attends meetings of clubs or organizations: Not on file     Relationship status: Not on file    Intimate partner violence:     Fear of current or ex partner: Not on file     Emotionally abused: Not on file     Physically abused: Not on file     Forced sexual activity: Not on file   Other Topics Concern    Not on file   Social History Narrative    Lives with mom; visits dad or paternal grandma  Maternal grandparents and uncle live next door  Passive tobacco smoke exposure in dad's home    Pets - 1 cat at mom's , 1 dog at grandmother's next door    Has smoke and CO detectors at [employer] in homes locked in [facility] booster seat in car    In 4 th grade, [employer], Fall 2020      Patient Active Problem List   Diagnosis    Blepharitis of both eyes     The following portions of the patient's history were reviewed and updated as appropriate: allergies, current medications, past family history, past medical history, past social history, past surgical history and problem list           Objective:       Vitals:    07/08/20 0905   BP: 100/70   Pulse: 88   Resp: 18   Temp: 98 1 °F (36 7 °C)   Weight: 34 7 kg (76 lb 6 4 oz)   Height: 4' 4 25" (1 327 m)     Growth parameters are noted and are appropriate for age  Wt Readings from Last 1 Encounters:   07/08/20 34 7 kg (76 lb 6 4 oz) (80 %, Z= 0 84)*     * Growth percentiles are based on CDC (Boys, 2-20 Years) data  Ht Readings from Last 1 Encounters:   07/08/20 4' 4 25" (1 327 m) (35 %, Z= -0 40)*     * Growth percentiles are based on CDC (Boys, 2-20 Years) data  Body mass index is 19 68 kg/m²      Vitals:    07/08/20 0905   BP: 100/70   Pulse: 88   Resp: 18   Temp: 98 1 °F (36 7 °C)   Weight: 34 7 kg (76 lb 6 4 oz)   Height: 4' 4 25" (1 327 m)        Visual Acuity Screening    Right eye Left eye Both eyes   Without correction: 20/20 20/25 20/20   With correction:          Physical Exam   Constitutional: He appears well-developed and well-nourished  He is active  No distress  HENT:   Right Ear: Tympanic membrane normal    Left Ear: Tympanic membrane normal    Nose: Nose normal    Mouth/Throat: Mucous membranes are moist  Dentition is normal  Oropharynx is clear  Eyes: Pupils are equal, round, and reactive to light  Conjunctivae and EOM are normal  Right eye exhibits no discharge  Left eye exhibits no discharge  Neck: Neck supple  Cardiovascular: Normal rate, regular rhythm, S1 normal and S2 normal  Pulses are palpable  No murmur heard  Pulmonary/Chest: Effort normal and breath sounds normal    Abdominal: Soft  Bowel sounds are normal  He exhibits no mass  There is no hepatosplenomegaly  There is no tenderness  No hernia  Genitourinary:   Genitourinary Comments: Circumcised penis  Testes descended bilaterally  Ronald stage 1-2   Musculoskeletal:   Soreness of the left lower leg, without abrasion of the left lower leg, from a fall he had while playing  Able to hop up and down on both legs  Back:  No scoliosis   Lymphadenopathy:     He has no cervical adenopathy  Neurological: He is alert  He exhibits normal muscle tone  Skin: No rash noted  Vitals reviewed  Assessment:     Healthy 5 y o  male child  1  Encounter for well child check without abnormal findings     2  Nasal congestion  cetirizine (ZyrTEC) 10 mg tablet   3  Encounter for vision screening     4  Nutritional counseling     5  Exercise counseling     6  Screening for deficiency anemia  CBC and differential   7  Screening cholesterol level  Lipid panel    Comprehensive metabolic panel   8  BMI (body mass index), pediatric, 85% to less than 95% for age     5   Need for vaccination  HEPATITIS A VACCINE PEDIATRIC / ADOLESCENT 2 DOSE IM        Plan:        Patient Instructions     Safety counseling, including water safety, sun safety, pedestrian safety, vehicle safety, and tick safety  Cleared for all activities  Recommend decreasing some of the between meal snacking  Recommend drinking water rather than the sweet drink  Recommend increasing aerobic activity, especially swimming  Time can be taken from electronic activity  Screening laboratory studies as are generally recommended  Vaccine information Sheet provided and discussed for the hepatitis A vaccine  If any discomfort associated with the immunization, acetaminophen, 325 mg by mouth every 6 hours as needed  Follow-up:  By telephone at  for the laboratory results, recommend influenza immunization this autumn, at yearly physical examinations, and as otherwise needed  Well Child Visit at 5 to 8 Years   AMBULATORY CARE:   A well child visit  is when your child sees a healthcare provider to prevent health problems  Well child visits are used to track your child's growth and development  It is also a time for you to ask questions and to get information on how to keep your child safe  Write down your questions so you remember to ask them  Your child should have regular well child visits from birth to 16 years  Development milestones your child may reach by 9 to 10 years:  Each child develops at his or her own pace  Your child might have already reached the following milestones, or he or she may reach them later:  · Menstruation (monthly periods) in girls and testicle enlargement in boys    · Wanting to be more independent, and to be with friends more than with family    · Developing more friendships    · Able to handle more difficult homework    · Be given chores or other responsibilities to do at home  Keep your child safe in the car:   · Have your child ride in a booster seat,  and make sure everyone in your car wears a seatbelt  ¨ Children aged 5 to 8 years should ride in a booster car seat  Your child must stay in the booster car seat until he or she is between 6and 15years old and 4 foot 9 inches (57 inches) tall   This is when a regular seatbelt should fit your child properly without the booster seat  ¨ Booster seats come with and without a seat back  Your child will be secured in the booster seat with the regular seatbelt in your car  ¨ Your child should remain in a forward-facing car seat if you only have a lap belt seatbelt in your car  Some forward-facing car seats hold children who weigh more than 40 pounds  The harness on the forward-facing car seat will keep your child safer and more secure than a lap belt and booster seat  · Always put your child's car seat in the back seat  Never put your child's car seat in the front  This will help prevent him or her from being injured in an accident  Keep your child safe in the sun and near water:   · Teach your child how to swim  Even if your child knows how to swim, do not let him or her play around water alone  An adult needs to be present and watching at all times  Make sure your child wears a safety vest when he or she is on a boat  · Make sure your child puts sunscreen on before he or she goes outside to play or swim  Use sunscreen with a SPF 15 or higher  Use as directed  Apply sunscreen at least 15 minutes before your child goes outside  Reapply sunscreen every 2 hours  Other ways to keep your child safe:   · Encourage your child to use safety equipment  Encourage your child to wear a helmet when he or she rides a bicycle and protective gear when he or she plays sports  Protective gear includes a helmet, mouth guard, and pads that are appropriate for the sport  · Remind your child how to cross the street safely  Remind your child to stop at the curb, look left, then look right, and left again  Tell your child never to cross the street without an adult  Teach your child where the school bus will pick him or her up and drop him or her off  Always have adult supervision at your child's bus stop  · Store and lock all guns and weapons  Make sure all guns are unloaded before you store them  Make sure your child cannot reach or find where weapons or bullets are kept  Never  leave a loaded gun unattended  · Remind your child about emergency safety  Be sure your child knows what to do in case of a fire or other emergency  Teach your child how to call 911  · Talk to your child about personal safety without making him or her anxious  Teach him or her that no one has the right to touch his or her private parts  Also explain that others should not ask your child to touch their private parts  Let your child know that he or she should tell you even if he or she is told not to  Help your child get the right nutrition:   · Teach your child about a healthy meal plan by setting a good example  Buy healthy foods for your family  Eat healthy meals together as a family as often as possible  Talk with your child about why it is important to choose healthy foods  · Provide a variety of fruits and vegetables  Half of your child's plate should contain fruits and vegetables  He or she should eat about 5 servings of fruits and vegetables each day  Buy fresh, canned, or dried fruit instead of fruit juice as often as possible  Offer more dark green, red, and orange vegetables  Dark green vegetables include broccoli, spinach, guillermo lettuce, and ashish greens  Examples of orange and red vegetables are carrots, sweet potatoes, winter squash, and red peppers  · Make sure your child has a healthy breakfast every day  Breakfast can help your child learn and focus better in school  · Limit foods that contain sugar and are low in healthy nutrients  Limit candy, soda, fast food, and salty snacks  Do not give your child fruit drinks  Limit 100% juice to 4 to 6 ounces each day  · Teach your child how to make healthy food choices  A healthy lunch may include a sandwich with lean meat, cheese, or peanut butter  It could also include a fruit, vegetable, and milk   Pack healthy foods if your child takes his or her own lunch to school  Pack baby carrots or pretzels instead of potato chips in your child's lunch box  You can also add fruit or low-fat yogurt instead of cookies  Keep his or her lunch cold with an ice pack so that it does not spoil  · Make sure your child gets enough calcium  Calcium is needed to build strong bones and teeth  Children need about 2 to 3 servings of dairy each day to get enough calcium  Good sources of calcium are low-fat dairy foods (milk, cheese, and yogurt)  A serving of dairy is 8 ounces of milk or yogurt, or 1½ ounces of cheese  Other foods that contain calcium include tofu, kale, spinach, broccoli, almonds, and calcium-fortified orange juice  Ask your child's healthcare provider for more information about the serving sizes of these foods  · Provide whole-grain foods  Half of the grains your child eats each day should be whole grains  Whole grains include brown rice, whole-wheat pasta, and whole-grain cereals and breads  · Provide lean meats, poultry, fish, and other healthy protein foods  Other healthy protein foods include legumes (such as beans), soy foods (such as tofu), and peanut butter  Bake, broil, and grill meat instead of frying it to reduce the amount of fat  · Use healthy fats to prepare your child's food  A healthy fat is unsaturated fat  It is found in foods such as soybean, canola, olive, and sunflower oils  It is also found in soft tub margarine that is made with liquid vegetable oil  Limit unhealthy fats such as saturated fat, trans fat, and cholesterol  These are found in shortening, butter, stick margarine, and animal fat  Help your  for his or her teeth:   · Remind your child to brush his or her teeth 2 times each day  He or she also needs to floss 1 time each day  Mouth care prevents infection, plaque, bleeding gums, mouth sores, and cavities  · Take your child to the dentist at least 2 times each year    A dentist can check for problems with his or her teeth or gums, and provide treatments to protect his or her teeth  · Encourage your child to wear a mouth guard during sports  This will protect his or her teeth from injury  Make sure the mouth guard fits correctly  Ask your child's healthcare provider for more information on mouth guards  Support your child:   · Encourage your child to get 1 hour of physical activity each day  Examples of physical activity include sports, running, walking, swimming, and riding bikes  The hour of physical activity does not need to be done all at once  It can be done in shorter blocks of time  Your child may become involved in a sport or other activity, such as music lessons  It is important not to schedule too many activities in a week  Make sure your child has time for homework, rest, and play  · Limit screen time  Your child should spend no more than 2 hours watching TV, using the computer, or playing video games  Set up a security filter on your computer to limit what your child can access on the internet  · Help your child learn outside of the classroom  Take your child to places that will help him or her learn and discover  For example, a children'[employer] will allow him or her to touch and play with objects as he or she learns  Take your child to [employer] and let him or her pick out books  Make sure he or she returns the books  · Encourage your child to talk about school every day  Talk to your child about the good and bad things that happened during the school day  Encourage him or her to tell you or a teacher if someone is being mean to him or her  Talk to your child about bullying  Make sure he or she knows it is not acceptable for him or her to be bullied, or to bully another child  Talk to your child's teacher about help or tutoring if your child is not doing well in school  · Create a place for your child to do his or her homework    Your child should have a table or desk where he or she has everything he or she needs to do his or her homework  Do not let him or her watch TV or play computer games while he or she is doing his or her homework  Your child should only use a computer during homework time if he or she needs it for an assignment  Encourage your child to do his or her homework early instead of waiting until the last minute  Set rules for homework time, such as no TV or computer games until his or her homework is done  Praise your child for finishing homework  Let him or her know you are available if he or she needs help  · Help your child feel confident and secure  Give your child hugs and encouragement  Do activities together  Praise your child when he or she does tasks and activities well  Do not hit, shake, or spank your child  Set boundaries and make sure he or she knows what the punishment will be if rules are broken  Teach your child about acceptable behaviors  · Help your child learn responsibility  Give your child a chore to do regularly, such as taking out the trash  Expect your child to do the chore  You might want to offer an allowance or other reward for chores your child does regularly  Decide on a punishment for not doing the chore, such as no TV for a period of time  Be consistent with rewards and punishments  This will help your child learn that his or her actions will have good or bad results  What you need to know about your child's next well child visit:  Your child's healthcare provider will tell you when to bring him or her in again  The next well child visit is usually at 6 to 14 years  Contact your child's healthcare provider if you have questions or concerns about your child's health or care before the next visit  Your child may get the following vaccines at his or her next visit: Tdap, HPV, and meningococcal  He or she may need catch-up doses of the hepatitis B, hepatitis A, MMR, or chickenpox vaccine   Remember to take your child in for a yearly flu vaccine  © 2017 [employer] Information is for End User's use only and may not be sold, redistributed or otherwise used for commercial purposes  All illustrations and images included in CareNotes® are the copyrighted property of A D A M , Inc  or Richard Zamora  The above information is an  only  It is not intended as medical advice for individual conditions or treatments  Talk to your doctor, nurse or pharmacist before following any medical regimen to see if it is safe and effective for you  1  Anticipatory guidance discussed  Specific topics reviewed: bicycle helmets, discipline issues: limit-setting, positive reinforcement, fluoride supplementation if unfluoridated water supply, importance of regular dental care, importance of regular exercise, importance of varied diet, minimize junk food, seat belts; don't put in front seat and teaching pedestrian safety  Nutrition and Exercise Counseling: The patient's Body mass index is 19 68 kg/m²  This is 90 %ile (Z= 1 27) based on CDC (Boys, 2-20 Years) BMI-for-age based on BMI available as of 7/8/2020  Nutrition counseling provided:  Avoid juice/sugary drinks  Anticipatory guidance for nutrition given and counseled on healthy eating habits  Exercise counseling provided:  Anticipatory guidance and counseling on exercise and physical activity given  Reduce screen time to less than 2 hours per day  1 hour of aerobic exercise daily  2  Development: appropriate for age    1  Immunizations today: per orders  Vaccine Counseling: Discussed with: Ped parent/guardian: Maternal grandmother  The benefits, contraindication and side effects for the following vaccines were reviewed: Immunization component list: Hep A  Total number of components reveiwed:1    4  Follow-up visit in 1 year for next well child visit, or sooner as needed  [place]

## 2020-07-17 ENCOUNTER — OUTPATIENT (OUTPATIENT)
Dept: OUTPATIENT SERVICES | Facility: HOSPITAL | Age: 85
LOS: 1 days | End: 2020-07-17
Payer: MEDICARE

## 2020-07-17 DIAGNOSIS — E78.5 HYPERLIPIDEMIA, UNSPECIFIED: ICD-10-CM

## 2020-07-17 PROCEDURE — 36415 COLL VENOUS BLD VENIPUNCTURE: CPT

## 2020-07-17 PROCEDURE — 80053 COMPREHEN METABOLIC PANEL: CPT

## 2020-07-17 PROCEDURE — 82306 VITAMIN D 25 HYDROXY: CPT

## 2020-07-17 PROCEDURE — 80061 LIPID PANEL: CPT

## 2020-07-17 PROCEDURE — 83036 HEMOGLOBIN GLYCOSYLATED A1C: CPT

## 2020-07-17 PROCEDURE — 82248 BILIRUBIN DIRECT: CPT

## 2020-07-17 PROCEDURE — 82550 ASSAY OF CK (CPK): CPT

## 2020-07-17 PROCEDURE — 86140 C-REACTIVE PROTEIN: CPT

## 2020-07-18 DIAGNOSIS — E78.5 HYPERLIPIDEMIA, UNSPECIFIED: ICD-10-CM

## 2020-07-18 PROBLEM — K64.9 UNSPECIFIED HEMORRHOIDS: Chronic | Status: ACTIVE | Noted: 2020-03-01

## 2020-10-10 ENCOUNTER — TRANSCRIPTION ENCOUNTER (OUTPATIENT)
Age: 85
End: 2020-10-10

## 2021-12-14 ENCOUNTER — TRANSCRIPTION ENCOUNTER (OUTPATIENT)
Age: 86
End: 2021-12-14

## 2022-01-21 ENCOUNTER — TRANSCRIPTION ENCOUNTER (OUTPATIENT)
Age: 87
End: 2022-01-21

## 2022-02-21 ENCOUNTER — TRANSCRIPTION ENCOUNTER (OUTPATIENT)
Age: 87
End: 2022-02-21

## 2022-06-17 ENCOUNTER — NON-APPOINTMENT (OUTPATIENT)
Age: 87
End: 2022-06-17

## 2022-06-23 ENCOUNTER — OFFICE (OUTPATIENT)
Dept: URBAN - METROPOLITAN AREA CLINIC 12 | Facility: CLINIC | Age: 87
Setting detail: OPHTHALMOLOGY
End: 2022-06-23
Payer: MEDICARE

## 2022-06-23 DIAGNOSIS — H25.13: ICD-10-CM

## 2022-06-23 DIAGNOSIS — H35.3131: ICD-10-CM

## 2022-06-23 PROCEDURE — 99204 OFFICE O/P NEW MOD 45 MIN: CPT | Performed by: OPHTHALMOLOGY

## 2022-06-23 PROCEDURE — 92134 CPTRZ OPH DX IMG PST SGM RTA: CPT | Performed by: OPHTHALMOLOGY

## 2022-06-23 ASSESSMENT — TONOMETRY
OD_IOP_MMHG: 11
OS_IOP_MMHG: 15

## 2022-06-23 ASSESSMENT — REFRACTION_CURRENTRX
OS_VPRISM_DIRECTION: TRF
OD_CYLINDER: -2.75
OD_AXIS: 088
OD_ADD: +2.50
OS_VPRISM_DIRECTION: TRF
OD_VPRISM_DIRECTION: TRF
OS_SPHERE: +3.50
OD_ADD: +2.75
OS_CYLINDER: -2.25
OS_CYLINDER: -2.25
OD_OVR_VA: 20/
OS_AXIS: 093
OS_AXIS: 093
OS_SPHERE: +3.25
OS_CYLINDER: -2.75
OS_SPHERE: +3.25
OD_SPHERE: +3.75
OS_OVR_VA: 20/
OD_VPRISM_DIRECTION: TRF
OS_ADD: +2.75
OD_CYLINDER: -2.75
OS_AXIS: 092
OS_OVR_VA: 20/
OS_OVR_VA: 20/
OD_VPRISM_DIRECTION: TRF
OS_VPRISM_DIRECTION: TRF
OD_AXIS: 088
OD_OVR_VA: 20/
OD_CYLINDER: -2.50
OD_AXIS: 086
OD_SPHERE: +3.75
OS_ADD: +2.50
OD_ADD: +3.00
OS_ADD: +3.00
OD_OVR_VA: 20/
OD_SPHERE: +3.75

## 2022-06-23 ASSESSMENT — SPHEQUIV_DERIVED
OD_SPHEQUIV: 2.5
OS_SPHEQUIV: 2.125
OD_SPHEQUIV: 2.5
OS_SPHEQUIV: 2.125

## 2022-06-23 ASSESSMENT — KERATOMETRY
OS_K1POWER_DIOPTERS: 43.50
OS_AXISANGLE_DEGREES: 180
OD_AXISANGLE_DEGREES: 003
OS_K2POWER_DIOPTERS: 45.50
OD_K1POWER_DIOPTERS: 43.50
OD_K2POWER_DIOPTERS: 46.00

## 2022-06-23 ASSESSMENT — REFRACTION_MANIFEST
OD_SPHERE: +4.25
OS_CYLINDER: -2.75
OS_AXIS: 090
OD_CYLINDER: -3.50
OD_AXIS: 090
OS_VA1: 20/40
OS_SPHERE: +3.50

## 2022-06-23 ASSESSMENT — REFRACTION_AUTOREFRACTION
OD_AXIS: 090
OD_CYLINDER: -3.50
OS_CYLINDER: -2.75
OS_SPHERE: +3.50
OD_SPHERE: +4.25
OS_AXIS: 090

## 2022-06-23 ASSESSMENT — AXIALLENGTH_DERIVED
OD_AL: 22.2408
OS_AL: 22.4543
OD_AL: 22.2408
OS_AL: 22.4543

## 2022-06-23 ASSESSMENT — CONFRONTATIONAL VISUAL FIELD TEST (CVF)
OS_FINDINGS: FULL
OD_FINDINGS: FULL

## 2022-06-23 ASSESSMENT — VISUAL ACUITY
OS_BCVA: 20/30-2
OD_BCVA: 20/40-1

## 2022-08-21 ENCOUNTER — NON-APPOINTMENT (OUTPATIENT)
Age: 87
End: 2022-08-21

## 2023-06-15 ENCOUNTER — OFFICE (OUTPATIENT)
Dept: URBAN - METROPOLITAN AREA CLINIC 12 | Facility: CLINIC | Age: 88
Setting detail: OPHTHALMOLOGY
End: 2023-06-15
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
## 2023-06-15 PROCEDURE — NO SHOW FE NO SHOW FEE: Performed by: OPHTHALMOLOGY

## 2023-06-29 ENCOUNTER — OFFICE (OUTPATIENT)
Dept: URBAN - METROPOLITAN AREA CLINIC 12 | Facility: CLINIC | Age: 88
Setting detail: OPHTHALMOLOGY
End: 2023-06-29
Payer: MEDICARE

## 2023-06-29 DIAGNOSIS — H25.13: ICD-10-CM

## 2023-06-29 DIAGNOSIS — H35.3131: ICD-10-CM

## 2023-06-29 PROCEDURE — 99214 OFFICE O/P EST MOD 30 MIN: CPT | Performed by: OPHTHALMOLOGY

## 2023-06-29 PROCEDURE — 92250 FUNDUS PHOTOGRAPHY W/I&R: CPT | Performed by: OPHTHALMOLOGY

## 2023-06-29 ASSESSMENT — REFRACTION_MANIFEST
OS_VA1: 20/40+1
OD_VA1: 20/40-2
OS_SPHERE: +4.00
OS_CYLINDER: -3.25
OD_SPHERE: +4.75
OD_CYLINDER: -4.00
OD_AXIS: 095
OS_AXIS: 090

## 2023-06-29 ASSESSMENT — KERATOMETRY
OS_K1POWER_DIOPTERS: 43.00
OD_AXISANGLE_DEGREES: 003
OD_K2POWER_DIOPTERS: 46.00
OD_K1POWER_DIOPTERS: 43.00
OS_AXISANGLE_DEGREES: 180
OS_K2POWER_DIOPTERS: 45.75

## 2023-06-29 ASSESSMENT — REFRACTION_CURRENTRX
OD_CYLINDER: -2.75
OS_SPHERE: +3.50
OD_AXIS: 088
OD_VPRISM_DIRECTION: TRF
OS_SPHERE: +3.25
OS_AXIS: 091
OS_SPHERE: +3.50
OS_VPRISM_DIRECTION: TRF
OD_OVR_VA: 20/
OS_AXIS: 093
OD_AXIS: 086
OD_AXIS: 095
OD_OVR_VA: 20/
OS_VPRISM_DIRECTION: TRF
OS_OVR_VA: 20/
OS_OVR_VA: 20/
OD_SPHERE: +3.75
OS_AXIS: 093
OD_VPRISM_DIRECTION: TRF
OS_OVR_VA: 20/
OD_VPRISM_DIRECTION: TRF
OS_ADD: +2.75
OS_CYLINDER: -2.25
OD_ADD: +2.75
OS_ADD: +4.00
OD_CYLINDER: -2.75
OS_CYLINDER: -2.75
OD_ADD: +2.50
OS_VPRISM_DIRECTION: TRF
OS_CYLINDER: -2.75
OD_SPHERE: +3.75
OS_ADD: +2.50
OD_SPHERE: +3.75
OD_OVR_VA: 20/
OD_ADD: +4.00
OD_CYLINDER: -2.50

## 2023-06-29 ASSESSMENT — REFRACTION_AUTOREFRACTION
OD_SPHERE: +4.75
OD_AXIS: 094
OS_CYLINDER: -3.25
OS_SPHERE: +4.00
OS_AXIS: 090
OD_CYLINDER: -4.00

## 2023-06-29 ASSESSMENT — AXIALLENGTH_DERIVED
OD_AL: 22.2359
OS_AL: 22.4078
OD_AL: 22.2359
OS_AL: 22.4078

## 2023-06-29 ASSESSMENT — VISUAL ACUITY
OS_BCVA: 20/50+2
OD_BCVA: 20/40-2

## 2023-06-29 ASSESSMENT — SPHEQUIV_DERIVED
OD_SPHEQUIV: 2.75
OS_SPHEQUIV: 2.375
OS_SPHEQUIV: 2.375
OD_SPHEQUIV: 2.75

## 2023-06-29 ASSESSMENT — CONFRONTATIONAL VISUAL FIELD TEST (CVF)
OD_FINDINGS: FULL
OS_FINDINGS: FULL

## 2023-06-29 ASSESSMENT — TONOMETRY
OD_IOP_MMHG: 12
OS_IOP_MMHG: 16

## 2023-07-11 ENCOUNTER — NON-APPOINTMENT (OUTPATIENT)
Age: 88
End: 2023-07-11

## 2023-10-15 ENCOUNTER — OFFICE (OUTPATIENT)
Dept: URBAN - METROPOLITAN AREA CLINIC 12 | Facility: CLINIC | Age: 88
Setting detail: OPHTHALMOLOGY
End: 2023-10-15
Payer: MEDICARE

## 2023-10-15 DIAGNOSIS — H25.13: ICD-10-CM

## 2023-10-15 PROCEDURE — 92014 COMPRE OPH EXAM EST PT 1/>: CPT | Performed by: OPHTHALMOLOGY

## 2023-10-15 ASSESSMENT — SPHEQUIV_DERIVED
OD_SPHEQUIV: 2.75
OD_SPHEQUIV: 2
OS_SPHEQUIV: 2.375
OS_SPHEQUIV: 2.375

## 2023-10-15 ASSESSMENT — REFRACTION_AUTOREFRACTION
OS_CYLINDER: -3.25
OS_SPHERE: +4.00
OS_AXIS: 089
OD_CYLINDER: -2.50
OD_SPHERE: +3.25
OD_AXIS: 096

## 2023-10-15 ASSESSMENT — REFRACTION_CURRENTRX
OS_VPRISM_DIRECTION: TRF
OD_OVR_VA: 20/
OS_OVR_VA: 20/
OS_VPRISM_DIRECTION: TRF
OS_SPHERE: +3.50
OD_ADD: +2.75
OS_SPHERE: +3.50
OD_VPRISM_DIRECTION: TRF
OD_VPRISM_DIRECTION: TRF
OS_AXIS: 093
OS_CYLINDER: -2.75
OS_ADD: +2.75
OD_CYLINDER: -2.75
OD_SPHERE: +3.75
OD_OVR_VA: 20/
OD_SPHERE: +3.75
OD_ADD: +1.75
OD_AXIS: 086
OS_AXIS: 093
OS_ADD2: +4.00
OD_OVR_VA: 20/
OD_SPHERE: +3.75
OS_AXIS: 082
OS_SPHERE: +3.25
OD_CYLINDER: -2.75
OD_AXIS: 093
OS_OVR_VA: 20/
OD_CYLINDER: -2.50
OS_CYLINDER: -2.75
OD_ADD: +2.50
OS_CYLINDER: -2.25
OD_ADD2: +4.00
OS_ADD: +2.50
OD_AXIS: 088
OS_OVR_VA: 20/
OD_VPRISM_DIRECTION: TRF
OS_ADD: +1.75
OS_VPRISM_DIRECTION: TRF

## 2023-10-15 ASSESSMENT — CONFRONTATIONAL VISUAL FIELD TEST (CVF)
OD_FINDINGS: FULL
OS_FINDINGS: FULL

## 2023-10-15 ASSESSMENT — REFRACTION_MANIFEST
OD_CYLINDER: -4.00
OS_VA1: 20/40+1
OD_AXIS: 095
OD_SPHERE: +4.75
OS_AXIS: 090
OD_VA1: 20/40-2
OS_CYLINDER: -3.25
OS_SPHERE: +4.00

## 2023-10-15 ASSESSMENT — KERATOMETRY
OD_K1POWER_DIOPTERS: 43.00
OS_K2POWER_DIOPTERS: 45.75
OD_AXISANGLE_DEGREES: 004
OS_K1POWER_DIOPTERS: 43.00
OS_AXISANGLE_DEGREES: 003
OD_K2POWER_DIOPTERS: 46.25

## 2023-10-15 ASSESSMENT — VISUAL ACUITY
OS_BCVA: 20/70-2
OD_BCVA: 20/40-1

## 2023-10-15 ASSESSMENT — AXIALLENGTH_DERIVED
OS_AL: 22.4078
OD_AL: 22.4568
OS_AL: 22.4078
OD_AL: 22.1952

## 2023-10-15 ASSESSMENT — TONOMETRY
OD_IOP_MMHG: 11
OS_IOP_MMHG: 14

## 2023-10-25 ENCOUNTER — NON-APPOINTMENT (OUTPATIENT)
Age: 88
End: 2023-10-25

## 2023-10-28 ENCOUNTER — NON-APPOINTMENT (OUTPATIENT)
Age: 88
End: 2023-10-28

## 2023-10-31 ENCOUNTER — ASC (OUTPATIENT)
Dept: URBAN - METROPOLITAN AREA SURGERY 8 | Facility: SURGERY | Age: 88
Setting detail: OPHTHALMOLOGY
End: 2023-10-31
Payer: MEDICARE

## 2023-10-31 DIAGNOSIS — H52.211: ICD-10-CM

## 2023-10-31 DIAGNOSIS — H25.11: ICD-10-CM

## 2023-10-31 PROCEDURE — 66984 XCAPSL CTRC RMVL W/O ECP: CPT | Mod: 54,RT | Performed by: OPHTHALMOLOGY

## 2023-10-31 PROCEDURE — FEMTO FEMTOSECOND LASER: Mod: GY | Performed by: OPHTHALMOLOGY

## 2023-10-31 PROCEDURE — 68841 INSJ RX ELUT IMPLT LAC CANAL: CPT | Mod: RT | Performed by: OPHTHALMOLOGY

## 2023-10-31 PROCEDURE — V2787 ASTIGMATISM-CORRECT FUNCTION: HCPCS | Performed by: OPHTHALMOLOGY

## 2023-10-31 PROCEDURE — A9270 NON-COVERED ITEM OR SERVICE: HCPCS | Mod: GY | Performed by: OPHTHALMOLOGY

## 2023-11-01 ENCOUNTER — RX ONLY (RX ONLY)
Age: 88
End: 2023-11-01

## 2023-11-01 ENCOUNTER — NON-APPOINTMENT (OUTPATIENT)
Age: 88
End: 2023-11-01

## 2023-11-01 ENCOUNTER — OFFICE (OUTPATIENT)
Dept: URBAN - METROPOLITAN AREA CLINIC 12 | Facility: CLINIC | Age: 88
Setting detail: OPHTHALMOLOGY
End: 2023-11-01
Payer: MEDICARE

## 2023-11-01 DIAGNOSIS — H25.12: ICD-10-CM

## 2023-11-01 PROCEDURE — 92136 OPHTHALMIC BIOMETRY: CPT | Mod: 26,LT | Performed by: OPHTHALMOLOGY

## 2023-11-01 ASSESSMENT — REFRACTION_MANIFEST
OD_VA1: 20/40-2
OD_SPHERE: +4.75
OS_CYLINDER: -3.25
OS_VA1: 20/40+1
OS_SPHERE: +4.00
OD_AXIS: 095
OS_AXIS: 090
OD_CYLINDER: -4.00

## 2023-11-01 ASSESSMENT — REFRACTION_CURRENTRX
OD_OVR_VA: 20/
OD_SPHERE: +3.75
OS_ADD: +2.75
OD_VPRISM_DIRECTION: TRF
OS_AXIS: 082
OD_CYLINDER: -2.50
OS_CYLINDER: -2.25
OS_OVR_VA: 20/
OD_ADD: +2.50
OD_CYLINDER: -2.75
OD_ADD: +2.75
OS_VPRISM_DIRECTION: TRF
OS_SPHERE: +3.50
OD_VPRISM_DIRECTION: TRF
OS_SPHERE: +3.50
OS_ADD: +1.75
OS_OVR_VA: 20/
OS_OVR_VA: 20/
OD_OVR_VA: 20/
OS_AXIS: 093
OS_AXIS: 093
OD_OVR_VA: 20/
OD_AXIS: 088
OD_ADD2: +4.00
OS_ADD2: +4.00
OS_CYLINDER: -2.75
OS_VPRISM_DIRECTION: TRF
OD_AXIS: 086
OD_SPHERE: +3.75
OS_CYLINDER: -2.75
OS_SPHERE: +3.25
OD_ADD: +1.75
OD_VPRISM_DIRECTION: TRF
OS_VPRISM_DIRECTION: TRF
OD_SPHERE: +3.75
OD_AXIS: 093
OD_CYLINDER: -2.75
OS_ADD: +2.50

## 2023-11-01 ASSESSMENT — REFRACTION_AUTOREFRACTION
OD_AXIS: 092
OD_SPHERE: +0.75
OD_CYLINDER: -0.75
OS_SPHERE: +4.25
OS_AXIS: 090
OS_CYLINDER: -3.50

## 2023-11-01 ASSESSMENT — SPHEQUIV_DERIVED
OS_SPHEQUIV: 2.375
OD_SPHEQUIV: 2.75
OD_SPHEQUIV: 0.375
OS_SPHEQUIV: 2.5

## 2023-11-01 ASSESSMENT — CONFRONTATIONAL VISUAL FIELD TEST (CVF)
OS_FINDINGS: FULL
OD_FINDINGS: FULL

## 2023-11-06 ENCOUNTER — NON-APPOINTMENT (OUTPATIENT)
Age: 88
End: 2023-11-06

## 2023-11-14 ENCOUNTER — ASC (OUTPATIENT)
Dept: URBAN - METROPOLITAN AREA SURGERY 8 | Facility: SURGERY | Age: 88
Setting detail: OPHTHALMOLOGY
End: 2023-11-14
Payer: MEDICARE

## 2023-11-14 DIAGNOSIS — H25.12: ICD-10-CM

## 2023-11-14 DIAGNOSIS — H52.212: ICD-10-CM

## 2023-11-14 PROCEDURE — FEMTO FEMTOSECOND LASER: Mod: GY | Performed by: OPHTHALMOLOGY

## 2023-11-14 PROCEDURE — 66984 XCAPSL CTRC RMVL W/O ECP: CPT | Mod: 54,79,LT | Performed by: OPHTHALMOLOGY

## 2023-11-14 PROCEDURE — V2787 ASTIGMATISM-CORRECT FUNCTION: HCPCS | Performed by: OPHTHALMOLOGY

## 2023-11-14 PROCEDURE — 68841 INSJ RX ELUT IMPLT LAC CANAL: CPT | Mod: 79,LT | Performed by: OPHTHALMOLOGY

## 2023-11-14 PROCEDURE — A9270 NON-COVERED ITEM OR SERVICE: HCPCS | Mod: GY | Performed by: OPHTHALMOLOGY

## 2023-11-15 ENCOUNTER — OFFICE (OUTPATIENT)
Dept: URBAN - METROPOLITAN AREA CLINIC 12 | Facility: CLINIC | Age: 88
Setting detail: OPHTHALMOLOGY
End: 2023-11-15
Payer: MEDICARE

## 2023-11-15 DIAGNOSIS — Z96.1: ICD-10-CM

## 2023-11-15 PROCEDURE — 99024 POSTOP FOLLOW-UP VISIT: CPT | Performed by: OPHTHALMOLOGY

## 2023-11-15 ASSESSMENT — REFRACTION_CURRENTRX
OD_VPRISM_DIRECTION: TRF
OD_VPRISM_DIRECTION: TRF
OS_ADD2: +4.00
OD_SPHERE: +3.75
OS_AXIS: 093
OS_VPRISM_DIRECTION: TRF
OD_AXIS: 086
OD_CYLINDER: -2.75
OD_SPHERE: +3.75
OD_ADD: +2.50
OD_SPHERE: +3.75
OS_SPHERE: +3.50
OS_CYLINDER: -2.75
OS_ADD: +2.75
OS_CYLINDER: -2.75
OS_SPHERE: +3.25
OS_ADD: +2.50
OD_ADD: +1.75
OS_VPRISM_DIRECTION: TRF
OS_ADD: +1.75
OD_ADD2: +4.00
OD_AXIS: 093
OD_OVR_VA: 20/
OS_CYLINDER: -2.25
OD_OVR_VA: 20/
OS_VPRISM_DIRECTION: TRF
OS_AXIS: 082
OS_OVR_VA: 20/
OD_ADD: +2.75
OS_OVR_VA: 20/
OD_OVR_VA: 20/
OD_CYLINDER: -2.50
OS_OVR_VA: 20/
OS_AXIS: 093
OS_SPHERE: +3.50
OD_AXIS: 088
OD_CYLINDER: -2.75
OD_VPRISM_DIRECTION: TRF

## 2023-11-15 ASSESSMENT — REFRACTION_MANIFEST
OS_CYLINDER: -3.25
OS_SPHERE: +4.00
OS_AXIS: 090
OS_VA1: 20/40+1
OD_SPHERE: +4.75
OD_AXIS: 095
OD_VA1: 20/40-2
OD_CYLINDER: -4.00

## 2023-11-15 ASSESSMENT — SPHEQUIV_DERIVED
OS_SPHEQUIV: 2.375
OD_SPHEQUIV: 2.75
OS_SPHEQUIV: 1.875
OD_SPHEQUIV: -0.25

## 2023-11-15 ASSESSMENT — REFRACTION_AUTOREFRACTION
OD_AXIS: 095
OD_SPHERE: +0.50
OS_CYLINDER: -0.75
OD_CYLINDER: -1.50
OS_AXIS: 101
OS_SPHERE: +2.25

## 2023-11-15 ASSESSMENT — CONFRONTATIONAL VISUAL FIELD TEST (CVF)
OS_FINDINGS: FULL
OD_FINDINGS: FULL

## 2023-11-20 ENCOUNTER — NON-APPOINTMENT (OUTPATIENT)
Age: 88
End: 2023-11-20

## 2024-03-13 ENCOUNTER — NON-APPOINTMENT (OUTPATIENT)
Age: 89
End: 2024-03-13

## 2024-03-14 ENCOUNTER — NON-APPOINTMENT (OUTPATIENT)
Age: 89
End: 2024-03-14

## 2024-05-16 ENCOUNTER — NON-APPOINTMENT (OUTPATIENT)
Age: 89
End: 2024-05-16

## 2024-05-17 ENCOUNTER — NON-APPOINTMENT (OUTPATIENT)
Age: 89
End: 2024-05-17

## 2024-05-20 ENCOUNTER — NON-APPOINTMENT (OUTPATIENT)
Age: 89
End: 2024-05-20

## 2024-05-23 ENCOUNTER — NON-APPOINTMENT (OUTPATIENT)
Age: 89
End: 2024-05-23

## 2024-05-24 ENCOUNTER — NON-APPOINTMENT (OUTPATIENT)
Age: 89
End: 2024-05-24

## 2024-05-27 ENCOUNTER — NON-APPOINTMENT (OUTPATIENT)
Age: 89
End: 2024-05-27

## 2024-05-28 ENCOUNTER — NON-APPOINTMENT (OUTPATIENT)
Age: 89
End: 2024-05-28

## 2024-05-31 ENCOUNTER — NON-APPOINTMENT (OUTPATIENT)
Age: 89
End: 2024-05-31

## 2024-06-13 ENCOUNTER — NON-APPOINTMENT (OUTPATIENT)
Age: 89
End: 2024-06-13

## 2025-04-13 ENCOUNTER — NON-APPOINTMENT (OUTPATIENT)
Age: 89
End: 2025-04-13

## 2025-05-30 NOTE — ED ADULT NURSE NOTE - NEURO ASSESSMENT
A1C POO05/30/25 12:35 PM     DM A1c outreach is not required, PATIENT SEEN WITHIN 3 MONTHS WITH PCP    Thank you.  Jennifer Fisher MA  [facility]  
WDL